# Patient Record
Sex: FEMALE | Race: WHITE | NOT HISPANIC OR LATINO | Employment: FULL TIME | URBAN - METROPOLITAN AREA
[De-identification: names, ages, dates, MRNs, and addresses within clinical notes are randomized per-mention and may not be internally consistent; named-entity substitution may affect disease eponyms.]

---

## 2017-10-24 ENCOUNTER — HOSPITAL ENCOUNTER (OUTPATIENT)
Dept: MAMMOGRAPHY | Facility: CLINIC | Age: 47
Discharge: HOME | End: 2017-10-24
Attending: FAMILY MEDICINE | Admitting: FAMILY MEDICINE
Payer: COMMERCIAL

## 2017-10-24 DIAGNOSIS — Z12.31 VISIT FOR SCREENING MAMMOGRAM: ICD-10-CM

## 2017-10-24 PROCEDURE — G0202 SCR MAMMO BI INCL CAD: HCPCS

## 2019-03-07 ENCOUNTER — HOSPITAL ENCOUNTER (OUTPATIENT)
Dept: MAMMOGRAPHY | Facility: CLINIC | Age: 49
Discharge: HOME | End: 2019-03-07
Attending: FAMILY MEDICINE | Admitting: FAMILY MEDICINE
Payer: COMMERCIAL

## 2019-03-07 DIAGNOSIS — Z12.31 VISIT FOR SCREENING MAMMOGRAM: ICD-10-CM

## 2019-03-07 PROCEDURE — 77067 SCR MAMMO BI INCL CAD: CPT

## 2024-05-15 ENCOUNTER — HOSPITAL ENCOUNTER (OUTPATIENT)
Dept: MAMMOGRAPHY | Facility: CLINIC | Age: 54
Discharge: HOME | End: 2024-05-15
Attending: FAMILY MEDICINE | Admitting: FAMILY MEDICINE
Payer: COMMERCIAL

## 2024-05-15 DIAGNOSIS — Z12.31 VISIT FOR SCREENING MAMMOGRAM: ICD-10-CM

## 2024-05-15 PROCEDURE — 77067 SCR MAMMO BI INCL CAD: CPT

## 2025-03-31 ENCOUNTER — APPOINTMENT (OUTPATIENT)
Dept: GENERAL RADIOLOGY | Facility: CLINIC | Age: 55
End: 2025-03-31
Attending: EMERGENCY MEDICINE
Payer: COMMERCIAL

## 2025-03-31 ENCOUNTER — HOSPITAL ENCOUNTER (EMERGENCY)
Facility: CLINIC | Age: 55
Discharge: HOME | End: 2025-03-31
Attending: EMERGENCY MEDICINE | Admitting: EMERGENCY MEDICINE
Payer: COMMERCIAL

## 2025-03-31 VITALS
SYSTOLIC BLOOD PRESSURE: 153 MMHG | DIASTOLIC BLOOD PRESSURE: 105 MMHG | OXYGEN SATURATION: 100 % | HEART RATE: 87 BPM | RESPIRATION RATE: 16 BRPM

## 2025-03-31 DIAGNOSIS — S52.501A CLOSED FRACTURE OF DISTAL END OF RIGHT RADIUS, UNSPECIFIED FRACTURE MORPHOLOGY, INITIAL ENCOUNTER: ICD-10-CM

## 2025-03-31 DIAGNOSIS — S63.502A LEFT WRIST SPRAIN, INITIAL ENCOUNTER: ICD-10-CM
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
## 2025-03-31 PROCEDURE — 96374 THER/PROPH/DIAG INJ IV PUSH: CPT

## 2025-03-31 PROCEDURE — 99284 EMERGENCY DEPT VISIT MOD MDM: CPT | Mod: 25

## 2025-03-31 PROCEDURE — 29125 APPL SHORT ARM SPLINT STATIC: CPT | Mod: RT

## 2025-03-31 PROCEDURE — 73110 X-RAY EXAM OF WRIST: CPT | Mod: 50

## 2025-03-31 PROCEDURE — 250N000011 HC RX IP 250 OP 636: Performed by: EMERGENCY MEDICINE

## 2025-03-31 RX ORDER — KETOROLAC TROMETHAMINE 15 MG/ML
30 INJECTION, SOLUTION INTRAMUSCULAR; INTRAVENOUS ONCE
Status: COMPLETED | OUTPATIENT
Start: 2025-03-31 | End: 2025-03-31

## 2025-03-31 RX ADMIN — KETOROLAC TROMETHAMINE 30 MG: 15 INJECTION, SOLUTION INTRAMUSCULAR; INTRAVENOUS at 07:19

## 2025-03-31 ASSESSMENT — COLUMBIA-SUICIDE SEVERITY RATING SCALE - C-SSRS
2. HAVE YOU ACTUALLY HAD ANY THOUGHTS OF KILLING YOURSELF IN THE PAST MONTH?: NO
6. HAVE YOU EVER DONE ANYTHING, STARTED TO DO ANYTHING, OR PREPARED TO DO ANYTHING TO END YOUR LIFE?: NO
1. IN THE PAST MONTH, HAVE YOU WISHED YOU WERE DEAD OR WISHED YOU COULD GO TO SLEEP AND NOT WAKE UP?: NO

## 2025-03-31 ASSESSMENT — ACTIVITIES OF DAILY LIVING (ADL)
ADLS_ACUITY_SCORE: 41

## 2025-03-31 NOTE — Clinical Note
Tita Reynoso was seen and treated in our emergency department on 3/31/2025.  She may return to work on 04/04/2025.  Patient off work today and tomorrow due to right wrist fracture.  Will be seeing orthopedics on Thursday, April 3 for further workability and management.     If you have any questions or concerns, please don't hesitate to call.      Deysi Ulrich MD

## 2025-03-31 NOTE — ED TRIAGE NOTES
Pt presents after fall on ice. Pt reports that she slipping landing of bottom and both amrs. Left wrist is most painful     Triage Assessment (Adult)       Row Name 03/31/25 0606          Triage Assessment    Airway WDL WDL        Respiratory WDL    Respiratory WDL WDL        Peripheral/Neurovascular WDL    Peripheral Neurovascular WDL WDL

## 2025-03-31 NOTE — ED PROVIDER NOTES
Emergency Department Note      History of Present Illness     Chief Complaint   Arm Injury      HPI   Tita Reynoso is a 54 year old female who presents to the ED for an arm injury. The patient reports falling today after slipping on the ice. She landed on her buttocks and both arms. No head trauma. She endorses soreness in her buttocks as well as pain in her left wrist. She denies any pain in the elbows, shoulders, legs, or neck. Patient is right hand dominant.    Independent Historian   None    Review of External Notes   None    Past Medical History     Medical History and Problem List   Urethral stenosis  Acute cystitis  Anxiety  Benign neoplasm of rectum  Oculodentodigital dysplasia  Polyp of colon  Sensorineural hearing loss of both ears  Syndactyly  Urethral stricture    Medications   No current outpatient medications on file.    Surgical History   No past surgical history on file.    Physical Exam     Patient Vitals for the past 24 hrs:   BP Pulse Resp SpO2   03/31/25 0900 (!) 153/105 87 -- 100 %   03/31/25 0641 -- -- 16 --   03/31/25 0610 (!) 169/95 91 -- 100 %     Physical Exam  Nursing note and vitals reviewed.    Constitutional:  Appears comfortable.    Cardiovascular:  Normal rate, regular rhythm.     Both radial pulses 2+.  Cap refill less than 2 seconds.  Musculoskeletal:  No elbow or shoulder tenderness. No lower extremity pain.  Left wrist reveals full range of motion with just some mild tenderness over the distal radius with compression but no snuffbox tenderness.  Right wrist reveals swelling over the snuffbox and tenderness over the distal radius with some mild snuffbox tenderness.  Limited range of motion due to pain there is no gross deformity.  Neurological:   Alert and oriented.  Sensation intact for both hands.    Skin:    Skin is warm and dry.   Psychiatric:   Behavior is normal. Appropriate mood and affect.     Judgment and thought content normal.     Diagnostics     Lab Results   Labs  Ordered and Resulted from Time of ED Arrival to Time of ED Departure - No data to display    Imaging   XR Wrist Bilateral G/E 3 Views   Final Result   IMPRESSION:       RIGHT: Acute, mildly impacted fracture of the right distal radius with intra-articular extension at the ulnar aspect of the fracture. Mild resulting positive ulnar variance. Soft tissue swelling about the right wrist. Mild degenerative arthritis STT    joints.      LEFT: Tiny ossific fragment at the tip of the left ulnar styloid appears well-corticated and is favored to be chronic. No evidence of acute fracture. Mild degenerative arthritis STT joints.        Independent Interpretation   X-ray right wrist shows distal radius fracture  X-ray left wrist is negative.    ED Course      Medications Administered   Medications   ketorolac (TORADOL) injection 30 mg (30 mg Intravenous $Given 3/31/25 0795)       Procedures     Splint Placement     Procedure: Splint Placement     Indication: Fracture    Consent: Verbal     Location: Right Wrist    Preparation: Wounds were cleansed and dressed with a non-adherent bandage     Procedure detail:   Splint was applied by Tech/Nurse with my assistance  Splint type: Thumb spica  and Long-arm posterior   Splint materilal: Fiberglass  After placement I checked and adjusted the fit as needed to ensure proper positioning/fit   Sensation and circulation are intact after splint placement     Patient Status: The patient tolerated the procedure well: Yes. There were no complications.    Discussion of Management   None    ED Course   ED Course as of 03/31/25 0922   Mon Mar 31, 2025   0653 I obtained history and examined the patient as noted above.         Additional Documentation  None    Medical Decision Making / Diagnosis     CMS Diagnoses: None    MIPS       None    [facility]   Tita Reynoso is a 54 year old female who fell and has right wrist pain and swelling.  Left wrist x-ray is negative.  This is a sprain.  She did not hit  her head.  Right wrist reveals a impacted distal radius fracture but no deformity or angulation does not need reduction.  She had some tenderness over the navicular even though it looked normal.  I am putting her in a thumb spica long-arm sugar-tong splint until she can be reevaluated at [facility] orthopedics.  She set up an appointment for Thursday.  They can do an MRI or further x-rays as needed of the navicular if it continues to be tender.  Ibuprofen to be used as needed.  Did give her Toradol that helped quite a bit.    Keep the splint on and keep it dry, ice, elevate, sling.  Ibuprofen 800 mg every 6-8 hours as needed with food for pain.  Contact [facility] orthopedics and be rechecked within a week.  You can also ice your left wrist as needed.    Disposition   The patient was discharged.     Diagnosis     ICD-10-CM    1. Closed fracture of distal end of right radius, unspecified fracture morphology, initial encounter  S52.501A     Pain also over the navicular      2. Left wrist sprain, initial encounter  S63.502A       3. Fall, initial encounter  W19.XXXA            Discharge Medications   There are no discharge medications for this patient.        Scribe Disclosure:  I, Steph Cantrell, am serving as a scribe at 7:53 AM on 3/31/2025 to document services personally performed by Deysi Ulrich MD based on my observations and the provider's statements to me.        Deysi Ulrich MD  03/31/25 9369

## 2025-03-31 NOTE — DISCHARGE INSTRUCTIONS
Keep the splint on and keep it dry, ice, elevate, sling.  Ibuprofen 800 mg every 6-8 hours as needed with food for pain.  Contact [facility] orthopedics and be rechecked within a week.  You can also ice your left wrist as needed.